# Patient Record
Sex: FEMALE | Race: WHITE | HISPANIC OR LATINO | URBAN - METROPOLITAN AREA
[De-identification: names, ages, dates, MRNs, and addresses within clinical notes are randomized per-mention and may not be internally consistent; named-entity substitution may affect disease eponyms.]

---

## 2023-02-06 DIAGNOSIS — N95.1 MENOPAUSAL AND FEMALE CLIMACTERIC STATES: Primary | ICD-10-CM

## 2024-07-01 ENCOUNTER — OFFICE VISIT (OUTPATIENT)
Dept: GYNECOLOGY | Facility: CLINIC | Age: 54
End: 2024-07-01
Payer: MEDICAID

## 2024-07-01 VITALS
RESPIRATION RATE: 20 BRPM | HEIGHT: 61 IN | TEMPERATURE: 99 F | OXYGEN SATURATION: 100 % | WEIGHT: 168 LBS | DIASTOLIC BLOOD PRESSURE: 85 MMHG | BODY MASS INDEX: 31.72 KG/M2 | SYSTOLIC BLOOD PRESSURE: 121 MMHG | HEART RATE: 94 BPM

## 2024-07-01 DIAGNOSIS — N30.10 INTERSTITIAL CYSTITIS: ICD-10-CM

## 2024-07-01 DIAGNOSIS — Z12.31 BREAST CANCER SCREENING BY MAMMOGRAM: ICD-10-CM

## 2024-07-01 DIAGNOSIS — R35.0 URINARY FREQUENCY: ICD-10-CM

## 2024-07-01 DIAGNOSIS — N95.1 VASOMOTOR SYMPTOMS DUE TO MENOPAUSE: Primary | ICD-10-CM

## 2024-07-01 DIAGNOSIS — N39.46 MIXED STRESS AND URGE URINARY INCONTINENCE: ICD-10-CM

## 2024-07-01 LAB
BACTERIA #/AREA URNS AUTO: ABNORMAL /HPF
BILIRUB UR QL STRIP.AUTO: NEGATIVE
CLARITY UR: CLEAR
COLOR UR AUTO: YELLOW
GLUCOSE UR QL STRIP: NORMAL
HGB UR QL STRIP: NEGATIVE
HYALINE CASTS #/AREA URNS LPF: ABNORMAL /LPF
KETONES UR QL STRIP: NEGATIVE
LEUKOCYTE ESTERASE UR QL STRIP: 250
MUCOUS THREADS URNS QL MICRO: ABNORMAL /LPF
NITRITE UR QL STRIP: NEGATIVE
PH UR STRIP: 5.5 [PH]
PROT UR QL STRIP: ABNORMAL
RBC #/AREA URNS AUTO: ABNORMAL /HPF
SP GR UR STRIP.AUTO: 1.03 (ref 1–1.03)
SQUAMOUS #/AREA URNS LPF: ABNORMAL /HPF
UROBILINOGEN UR STRIP-ACNC: NORMAL
WBC #/AREA URNS AUTO: ABNORMAL /HPF

## 2024-07-01 PROCEDURE — 99215 OFFICE O/P EST HI 40 MIN: CPT | Mod: PBBFAC

## 2024-07-01 PROCEDURE — 81001 URINALYSIS AUTO W/SCOPE: CPT

## 2024-07-01 RX ORDER — CARIPRAZINE 1.5 MG/1
1.5 CAPSULE, GELATIN COATED ORAL
COMMUNITY

## 2024-07-01 RX ORDER — ARMODAFINIL 250 MG/1
TABLET ORAL
COMMUNITY

## 2024-07-01 RX ORDER — HYOSCYAMINE SULFATE 0.125 MG
125 TABLET ORAL
COMMUNITY
Start: 2024-05-30

## 2024-07-01 RX ORDER — CYCLOBENZAPRINE HCL 5 MG
5 TABLET ORAL 2 TIMES DAILY PRN
COMMUNITY
Start: 2024-05-30

## 2024-07-01 RX ORDER — ATENOLOL 50 MG/1
50 TABLET ORAL 2 TIMES DAILY
COMMUNITY

## 2024-07-01 RX ORDER — MIRABEGRON 50 MG/1
1 TABLET, FILM COATED, EXTENDED RELEASE ORAL
COMMUNITY

## 2024-07-01 RX ORDER — VALSARTAN 160 MG/1
160 TABLET ORAL
COMMUNITY

## 2024-07-01 RX ORDER — ROPINIROLE 0.5 MG/1
0.5 TABLET, FILM COATED ORAL
COMMUNITY

## 2024-07-01 RX ORDER — PROGESTERONE 200 MG/1
200 CAPSULE ORAL NIGHTLY
Qty: 30 CAPSULE | Refills: 11 | Status: SHIPPED | OUTPATIENT
Start: 2024-07-01 | End: 2025-07-01

## 2024-07-01 RX ORDER — LEVOTHYROXINE SODIUM 75 UG/1
75 TABLET ORAL
COMMUNITY

## 2024-07-01 RX ORDER — DEXTROAMPHETAMINE SACCHARATE, AMPHETAMINE ASPARTATE, DEXTROAMPHETAMINE SULFATE AND AMPHETAMINE SULFATE 7.5; 7.5; 7.5; 7.5 MG/1; MG/1; MG/1; MG/1
1 TABLET ORAL 2 TIMES DAILY
COMMUNITY

## 2024-07-01 RX ORDER — BUPROPION HYDROCHLORIDE 300 MG/1
TABLET ORAL
COMMUNITY

## 2024-07-01 RX ORDER — ESTRADIOL 0.03 MG/D
1 PATCH TRANSDERMAL
COMMUNITY
End: 2024-07-01 | Stop reason: DRUGHIGH

## 2024-07-01 RX ORDER — ATORVASTATIN CALCIUM 40 MG/1
40 TABLET, FILM COATED ORAL
COMMUNITY

## 2024-07-01 RX ORDER — ESTRADIOL 0.05 MG/D
1 PATCH TRANSDERMAL
Qty: 4 PATCH | Refills: 11 | Status: SHIPPED | OUTPATIENT
Start: 2024-07-01 | End: 2025-07-01

## 2024-07-01 RX ORDER — HYDROXYZINE HYDROCHLORIDE 50 MG/1
TABLET, FILM COATED ORAL
COMMUNITY
Start: 2024-05-30

## 2024-07-01 RX ORDER — ZOLPIDEM TARTRATE 5 MG/1
5 TABLET ORAL NIGHTLY
COMMUNITY
Start: 2024-05-30

## 2024-07-01 NOTE — PROGRESS NOTES
"  [facility] GYNECOLOGY CLINIC NOTE     Richa Blum is a 54 y.o.  presenting to GYN clinic for worsening vasomotor symptoms secondary to menopause, currently on HRT. Her symptoms have been managed with Climara 0.025mg estrogen with the addition of Provera 5mg qD for the past two years, which improved her symptoms until about six months ago. Since then, she has been having night sweats requiring use of a window unit and ceiling fan. Of note, she had one episode of PMB when she went one week without her HRT patch about a year ago; the episode resolved spontaneously with application of the subsequent patch, and she denies any additional episodes.  She also endorses a h/o mixed urinary incontinence, for which she has been taking Myrbetriq for less than a year - she has noted significant improvement in the stress incontinence. In addition, she reports a history of interstitial cystitis (only has occasional, infrequent episodes of "bladder pain" when taking hot showers), for which her urethra was dilated during cystoscopy by her urologist approximately 20y ago - she has not seen a urologist since then. She currently reports increased urinary frequency and urinary hesitancy; denies dysuria, hematuria, incomplete emptying, and urinary retention.     Gynecology  Menopause: age 49  Endorses remote h/o abnormal pap smear at age 24 s/p cryotherapy with subsequently normal pap smears (most recently 1y ago)    OB History    Para Term  AB Living   8 6 6 0 2 6   SAB IAB Ectopic Multiple Live Births   0 0 0 0 0      # Outcome Date GA Lbr Chacorta/2nd Weight Sex Type Anes PTL Lv   8 AB            7 AB            6 Term      Vag-Spont      5 Term      Vag-Spont      4 Term      Vag-Spont      3 Term      Vag-Spont      2 Term      Vag-Spont      1 Term      Vag-Spont        Past Medical History:   Diagnosis Date    Class 1 obesity     Diabetes mellitus     previously on Ozempic, now transitioned to Semaglutide PO    " "History of genital warts     age 19    Hyperlipidemia     Hypertension     managed with Valsartan and Atenolol    Hypothyroidism     managed with Unithroid    Mental disorder       Past Surgical History:   Procedure Laterality Date    CYSTOSCOPY WITH URETHRAL DILATION      interstitial cystitis, last saw Urology since 2004    EXPLORATORY LAPAROTOMY      Endometriosis      Current Outpatient Medications   Medication Instructions    atenoloL (TENORMIN) 50 mg, Oral, 2 times daily    atorvastatin (LIPITOR) 40 mg, Oral    buPROPion (WELLBUTRIN XL) 300 MG 24 hr tablet     cyclobenzaprine (FLEXERIL) 5 mg, Oral, 2 times daily PRN    dextroamphetamine-amphetamine 30 mg Tab 1 tablet, Oral, 2 times daily    estradiol 0.05 mg/24 hr td ptwk (CLIMARA) 0.05 mg/24 hr PTWK 1 patch, Transdermal, Every 7 days    hydrOXYzine (ATARAX) 50 MG tablet Oral    hyoscyamine (ANASPAZ,LEVSIN) 125 mcg, Oral    MYRBETRIQ 50 mg Tb24 1 tablet, Oral    NUVIGIL 250 mg tablet     progesterone (PROMETRIUM) 200 mg, Oral, Nightly    rOPINIRole (REQUIP) 0.5 mg, Oral    semaglutide (RYBELSUS) 3 mg, Oral, Daily    UNITHROID 75 mcg, Oral    valsartan (DIOVAN) 160 mg, Oral    VRAYLAR 1.5 mg, Oral    zolpidem (AMBIEN) 5 mg, Oral, Nightly     Social History     Tobacco Use    Smoking status: Never     Passive exposure: Never    Smokeless tobacco: Never   Substance Use Topics    Alcohol use: Not Currently    Drug use: Never     Review of Systems  Pertinent items are noted in HPI.     Objective:     /85 (BP Location: Left arm, Patient Position: Sitting, BP Method: Large (Automatic))   Pulse 94   Temp 99.1 °F (37.3 °C)   Resp 20   Ht 5' 1" (1.549 m)   Wt 76.2 kg (168 lb)   SpO2 100%   BMI 31.74 kg/m²   Physical Exam:  Gen: Well-nourished, well-developed female appearing stated age. Alert, cooperative, in no acute distress.   CV: Regular rate  Chest: No conversational dyspnea  Abdomen: Soft, non-tender, no masses  Extrem: Extremities normal, atraumatic, " non-tender calves  External genitalia: Normal female genetalia without lesion, discharge or tenderness. Urethra unremarkable and normal in appearance.  Speculum Exam: Vaginal vault mildly atrophic without discharge, non-odorous, no lesions/masses seen. Cervical os visualized as closed, no lesions/masses.   Bimanual Exam: No uterine or cervical motion tenderness. Uterus anteverted, 6cm in size, mobile. No adnexal masses, fullness, or tenderness. No bladder neck or urethral tenderness to palpation.   Note: RN chaperone present for entirety of exam.    Assessment:       54 y.o.  here for evaluation of worsening menopausal vasomotor symptoms, as well as urinary symptoms in the setting of multiple urogynecological conditions.    1. Vasomotor symptoms due to menopause        2. Urinary frequency  Urinalysis, Reflex to Urine Culture      3. Interstitial cystitis  Ambulatory referral/consult to Urology      4. Mixed stress and urge urinary incontinence  Ambulatory referral/consult to Urology      5. Breast cancer screening by mammogram  Mammo Digital Screening Bilat        Plan:     -- menopausal symptoms previously managed on Climara 0.025mg qwk with Provera 5mg qD for endometrial protection; will increase dose of weekly estradiol to 0.05mg and transition Provera to Prometrium 200mg qhs   -- will obtain urinalysis to rule out infectious cause of increasing urinary frequency and urinary hesitancy; will also send Urology referral given lower urinary tract symptoms in the setting of interstitial cystitis, which required urethral dilation in the past, as well as mixed urinary incontinence, currently managed on Myrbetriq  -- MMG ordered for breast cancer screening as most recent was reportedly >4y ago    Problem List Items Addressed This Visit    None  Visit Diagnoses       Vasomotor symptoms due to menopause    -  Primary    Urinary frequency        Relevant Orders    Urinalysis, Reflex to Urine Culture (Completed)     Interstitial cystitis        Relevant Orders    Ambulatory referral/consult to Urology    Mixed stress and urge urinary incontinence        Relevant Orders    Ambulatory referral/consult to Urology    Breast cancer screening by mammogram        Relevant Orders    Mammo Digital Screening Bilat          Return to clinic in 6 months for telemedicine visit    Discussed patient and plan with Dr. Saldana.    Shaina Cuba MD  [employer] [facility], PGY-4

## 2024-08-13 ENCOUNTER — TELEPHONE (OUTPATIENT)
Dept: GYNECOLOGY | Facility: CLINIC | Age: 54
End: 2024-08-13
Payer: MEDICAID

## 2024-08-13 NOTE — TELEPHONE ENCOUNTER
Good morning!     I was following up on referrals and noticed that this patient has not been scheduled to see Urology. Can patient tried to be reached again for scheduling? Or can we get an update on the referral please.     Thank you!

## 2025-03-17 ENCOUNTER — TELEPHONE (OUTPATIENT)
Dept: GYNECOLOGY | Facility: CLINIC | Age: 55
End: 2025-03-17
Payer: MEDICAID

## 2025-03-18 ENCOUNTER — TELEPHONE (OUTPATIENT)
Dept: GYNECOLOGY | Facility: CLINIC | Age: 55
End: 2025-03-18
Payer: MEDICAID

## 2025-03-19 ENCOUNTER — CLINICAL SUPPORT (OUTPATIENT)
Dept: GYNECOLOGY | Facility: CLINIC | Age: 55
End: 2025-03-19
Payer: MEDICAID

## 2025-03-19 DIAGNOSIS — N95.1 VASOMOTOR SYMPTOMS DUE TO MENOPAUSE: Primary | ICD-10-CM

## 2025-03-19 RX ORDER — ZOLPIDEM TARTRATE 10 MG
5 TABLET ORAL NIGHTLY
COMMUNITY
Start: 2025-01-27

## 2025-03-19 RX ORDER — IBUPROFEN 800 MG/1
800 TABLET ORAL EVERY 8 HOURS PRN
COMMUNITY
Start: 2025-01-27

## 2025-03-19 RX ORDER — ORAL SEMAGLUTIDE 14 MG/1
14 TABLET ORAL
COMMUNITY
Start: 2024-12-11

## 2025-03-19 NOTE — ASSESSMENT & PLAN NOTE
- Currently on climara 0.05 mg and prometrium 200 qhs with breakthrough symptoms  - Discussed possible etiology of patch falling off. Encouraged pt to speak to pharmacy to request the brand name, as the generic patch is not sticking on her when she showers. Also discussed covering patch with ziploc or other waterproof material during showers. Will follow up symptoms with these two intervenrtions  - Given breakthrough bleeding, will order pelvic ultrasound   - Can consider uptitrating HRT vs veozah if patient continues to have symptoms at next visit. She has a CMP due to be drawn at the end of this month   - RTC 1 month to follow up

## 2025-03-19 NOTE — PROGRESS NOTES
[facility] Women[facility] Progress Note    Primary Site: [facility]  Patient location: Home  Physician location: In office      Chief Complaint: follow up HRT     HPI  Richa Blum joined me via our telemedicine platform.    Pt on HRT for vasomotor symptoms, was increased to 0.05 mg climara and prometrium 200 qhs on 7/1/2024 for persistent symptoms   Says this worked for a while, but now is having symptoms again. Also forgets to take prometrium and occasionally has breakthrough bleeding  Her pharmacy switched her to generic climara and the patch does not stay on after she showers it falls off. She did not have this problem with the Climara brand name     I have reviewed family history, social history, medications and allergies as documented in the patient's electronic medical record.      Reports, labs, outside records, and images have been reviewed with pertinent interpretations below. See telemedicine notes records for intervening communications.        Assessment/Plan  Problem List Items Addressed This Visit       Vasomotor symptoms due to menopause - Primary    - Currently on climara 0.05 mg and prometrium 200 qhs with breakthrough symptoms  - Discussed possible etiology of patch falling off. Encouraged pt to speak to pharmacy to request the brand name, as the generic patch is not sticking on her when she showers. Also discussed covering patch with ziploc or other waterproof material during showers. Will follow up symptoms with these two intervenrtions  - Given breakthrough bleeding, will order pelvic ultrasound   - Can consider uptitrating HRT vs veozah if patient continues to have symptoms at next visit. She has a CMP due to be drawn at the end of this month   - RTC 1 month to follow up          Relevant Orders    US Pelvis Comp with Transvag NON-OB (xpd         See correspondence above for plan.   Patient's needs assessed and health education provided. Patient understands risks, benefits, and alternatives  of treatment prescribed above. Patient verbalizes understanding and agrees to follow plan.    Patient's identity was confirmed and confidentiality/privacy confirmed prior to visit. I certify that this visit was done via secure two-way transmission with informed consent of the patient and/or guardian.  Each patient to whom I provide medical services by telemedicine is:  (1) informed of the relationship between the physician and patient and the respective role of any other health care provider with respect to management of the patient; and (2) notified that they may decline to receive medical services by telemedicine and may withdraw from such care at any time. Patient verbally consented to receive this service via voice-only telephone call.    .soco

## 2025-03-25 ENCOUNTER — HOSPITAL ENCOUNTER (OUTPATIENT)
Dept: RADIOLOGY | Facility: HOSPITAL | Age: 55
Discharge: HOME | End: 2025-03-25
Attending: STUDENT IN AN ORGANIZED HEALTH CARE EDUCATION/TRAINING PROGRAM
Payer: MEDICAID

## 2025-03-25 DIAGNOSIS — N95.1 VASOMOTOR SYMPTOMS DUE TO MENOPAUSE: ICD-10-CM

## 2025-03-25 PROCEDURE — 76830 TRANSVAGINAL US NON-OB: CPT | Mod: TC

## 2025-05-07 ENCOUNTER — CLINICAL SUPPORT (OUTPATIENT)
Dept: GYNECOLOGY | Facility: CLINIC | Age: 55
End: 2025-05-07
Payer: MEDICAID

## 2025-05-07 DIAGNOSIS — R93.89 INCREASED ENDOMETRIAL STRIPE THICKNESS: ICD-10-CM

## 2025-05-07 DIAGNOSIS — N95.1 VASOMOTOR SYMPTOMS DUE TO MENOPAUSE: Primary | ICD-10-CM

## 2025-05-07 NOTE — PROGRESS NOTES
[facility] Women[facility] Clinic Progress Note    Primary Site: [facility]  Patient location: Home  Physician location: In office      Chief Complaint: f/up HRT and  f/up pelvic US    HPI  Richa Blum joined me via our telemedicine platform.    55yo  was seen 2024 for worseing vasomotor symptoms of menopause. She had already been on climara 0.025/PO provera 5mg for 2 years, but 2024 her symptoms started to worsen. At the visit she reported a single episode of PMB when she missed her HRT for a week. 2024 her climara dose was increased to 0.05mg and provera was switched to prometrium .    At her f/up 3/2025 she reported the increased dose worked for a while but then symptoms returned. She reported having forgotten to take the prometrium a few times and having vaginal bleeding. Pelvic US was ordered.    Pelvic US shows thickened endometrial stripe at 5.4mm. she reports no further bleeding after 3.2025 bc she set an alarm for the prometrium.      I have reviewed family history, social history, medications and allergies as documented in the patient's electronic medical record.    Reports, labs, outside records, and images have been reviewed with pertinent interpretations below. See telemedicine notes records for intervening communications.    Assessment/Plan  Problem List Items Addressed This Visit          Renal/    Vasomotor symptoms due to menopause - Primary     Other Visit Diagnoses         Increased endometrial stripe thickness                    Due to thickened EMS noted on US, will plan to do endometrial sampling- appt in 1-2 wk  Recommend patient stop using the climara and prometrium now  Discussed veozah as a potential option for vasomotor sx of menopause  Patient reports medical problems of- DM (A1c <6 per patient) ,hypothyroid, HTN. Rarely drinks alcohol. No hep B or C.     Had labs at [facility] 2025 - cannot see in care everywhere. Patient reports them as:  AST/ALT   Cr  0.92    Sent message to clinic staff to request lab records. Reviewed need for CMP monthly followed by q3 month to monitor for transaminitis. Will send rx for 1 mo, schedule for telephone call in 1mo to review sx and order next CMP    Appts for EMB and veozah f/up requested    See correspondence above for plan.   Patient's needs assessed and health education provided. Patient understands risks, benefits, and alternatives of treatment prescribed above. Patient verbalizes understanding and agrees to follow plan.    Patient's identity was confirmed and confidentiality/privacy confirmed prior to visit. I certify that this visit was done via secure two-way transmission with informed consent of the patient and/or guardian.  Each patient to whom I provide medical services by telemedicine is:  (1) informed of the relationship between the physician and patient and the respective role of any other health care provider with respect to management of the patient; and (2) notified that they may decline to receive medical services by telemedicine and may withdraw from such care at any time. Patient verbally consented to receive this service via voice-only telephone call.    Danae Phillips MD  [facility] PGY4

## 2025-05-08 ENCOUNTER — TELEPHONE (OUTPATIENT)
Dept: GYNECOLOGY | Facility: CLINIC | Age: 55
End: 2025-05-08
Payer: MEDICAID

## 2025-05-08 NOTE — TELEPHONE ENCOUNTER
----- Message from Danae Phillips sent at 5/7/2025  6:41 PM CDT -----  Regarding: records  Hello, she recently got a CMP at [facility]. I can't see it in care everywhere, can you please request a copy of this? Thanks!

## 2025-05-08 NOTE — TELEPHONE ENCOUNTER
"----- Message from Danae Phillips sent at 5/7/2025  6:52 PM CDT -----  Regarding: schedule 2 appts  Please schedule for emb in 1-2 weeksAnd schedule for "veozah f/up" in 1mo telemed  "

## 2025-06-11 ENCOUNTER — LAB VISIT (OUTPATIENT)
Dept: LAB | Facility: HOSPITAL | Age: 55
End: 2025-06-11
Attending: STUDENT IN AN ORGANIZED HEALTH CARE EDUCATION/TRAINING PROGRAM
Payer: MEDICAID

## 2025-06-11 ENCOUNTER — PROCEDURE VISIT (OUTPATIENT)
Dept: GYNECOLOGY | Facility: CLINIC | Age: 55
End: 2025-06-11
Payer: MEDICAID

## 2025-06-11 VITALS
RESPIRATION RATE: 18 BRPM | OXYGEN SATURATION: 100 % | HEIGHT: 61 IN | BODY MASS INDEX: 39.46 KG/M2 | WEIGHT: 209 LBS | SYSTOLIC BLOOD PRESSURE: 135 MMHG | TEMPERATURE: 98 F | DIASTOLIC BLOOD PRESSURE: 84 MMHG | HEART RATE: 100 BPM

## 2025-06-11 DIAGNOSIS — N95.1 VASOMOTOR SYMPTOMS DUE TO MENOPAUSE: ICD-10-CM

## 2025-06-11 DIAGNOSIS — N95.0 POSTMENOPAUSAL BLEEDING: Primary | ICD-10-CM

## 2025-06-11 LAB
ALBUMIN SERPL-MCNC: 3.8 G/DL (ref 3.5–5)
ALBUMIN/GLOB SERPL: 1 RATIO (ref 1.1–2)
ALP SERPL-CCNC: 130 UNIT/L (ref 40–150)
ALT SERPL-CCNC: 38 UNIT/L (ref 0–55)
ANION GAP SERPL CALC-SCNC: 8 MEQ/L
AST SERPL-CCNC: 28 UNIT/L (ref 11–45)
BILIRUB SERPL-MCNC: 0.5 MG/DL
BUN SERPL-MCNC: 15.2 MG/DL (ref 9.8–20.1)
CALCIUM SERPL-MCNC: 9.3 MG/DL (ref 8.4–10.2)
CHLORIDE SERPL-SCNC: 105 MMOL/L (ref 98–107)
CO2 SERPL-SCNC: 26 MMOL/L (ref 22–29)
CREAT SERPL-MCNC: 1 MG/DL (ref 0.55–1.02)
CREAT/UREA NIT SERPL: 15
GFR SERPLBLD CREATININE-BSD FMLA CKD-EPI: >60 ML/MIN/1.73/M2
GLOBULIN SER-MCNC: 3.8 GM/DL (ref 2.4–3.5)
GLUCOSE SERPL-MCNC: 162 MG/DL (ref 74–100)
POTASSIUM SERPL-SCNC: 4.9 MMOL/L (ref 3.5–5.1)
PROT SERPL-MCNC: 7.6 GM/DL (ref 6.4–8.3)
SODIUM SERPL-SCNC: 139 MMOL/L (ref 136–145)

## 2025-06-11 PROCEDURE — 88305 TISSUE EXAM BY PATHOLOGIST: CPT | Mod: TC

## 2025-06-11 PROCEDURE — 36415 COLL VENOUS BLD VENIPUNCTURE: CPT

## 2025-06-11 PROCEDURE — 80053 COMPREHEN METABOLIC PANEL: CPT

## 2025-06-11 RX ORDER — CARIPRAZINE 1.5 MG/1
1.5 CAPSULE, GELATIN COATED ORAL
COMMUNITY

## 2025-06-12 NOTE — PROCEDURES
Richa Blum presented to clinic for EMB in setting of thickened EMS and few episodes of PMB when patient was on HRT and forgot to take prometrium sometimes.    Per prior documentation:  She had already been on climara 0.025/PO provera 5mg for 2 years, but 1/2024 her symptoms started to worsen. At the visit she reported a single episode of PMB when she missed her HRT for a week. 7/2024 her climara dose was increased to 0.05mg and provera was switched to prometrium .  At her f/up 3/2025 she reported the increased dose worked for a while but then symptoms returned. She reported having forgotten to take the prometrium a few times and having vaginal bleeding. Pelvic US was ordered.  Pelvic US shows thickened endometrial stripe at 5.4mm. she reports no further bleeding after 3.2025 bc she set an alarm for the prometrium.    HRT was discontinued and she was switched to Veozah for vasomotor symptoms. Reports it is okay but not as good as the HRT.    Vitals:    06/11/25 1305   BP: 135/84   Pulse: 100   Resp: 18   Temp: 98.3 °F (36.8 °C)     Endometrial biopsy    Date/Time: 6/11/2025 12:15 PM    Performed by: Danae Phillips MD  Authorized by: Danae Phillips MD    Consent:     Prior to procedure the appropriate consent was completed and verified      Consent given by:  Patient    Patient questions answered: yes      Patient agrees, verbalizes understanding, and wants to proceed: yes      Instructions and paperwork completed: yes    Pre-procedure:     Pre-procedure timeout performed: yes    Procedure:     Procedure: endometrial biopsy with Pipelle      Cervix cleaned and prepped: yes      Uterus sound depth (cm):  9    Curettes used: 3 passes.    Specimen collected: specimen collected and sent to pathology      Patient tolerated procedure well with no complications: yes      PMB- EMB completed today  Vasomotor symptoms- continue veozah. CMP today to trend LFTs.    Danae Phillips MD  [facility] PGY4

## 2025-06-13 LAB
ESTROGEN SERPL-MCNC: NORMAL PG/ML
INSULIN SERPL-ACNC: NORMAL U[IU]/ML
LAB AP GROSS DESCRIPTION: NORMAL
LAB AP REPORT FOOTNOTES: NORMAL

## 2025-06-14 ENCOUNTER — RESULTS FOLLOW-UP (OUTPATIENT)
Dept: GYNECOLOGY | Facility: CLINIC | Age: 55
End: 2025-06-14

## 2025-06-14 DIAGNOSIS — N95.0 POSTMENOPAUSAL BLEEDING: Primary | ICD-10-CM

## 2025-06-16 ENCOUNTER — TELEPHONE (OUTPATIENT)
Dept: GYNECOLOGY | Facility: CLINIC | Age: 55
End: 2025-06-16
Payer: MEDICAID

## 2025-06-16 NOTE — TELEPHONE ENCOUNTER
Called and inform patient about her US appointment on 06/202/2025 at 1 pm. Patient verbalized understanding.     ----- Message from Danae Phillips sent at 6/16/2025  2:49 PM CDT -----  Regarding: pelvic ultrasound  Patient has a virtual follow-up appointment scheduled 7/2, I just ordered an ultrasound, is there any way we can get the ultrasound scheduled before her follow-up appointment?    If not, then can we get the ultrasound scheduled and move her appointment after the ultrasound is done, thanks

## 2025-06-20 ENCOUNTER — HOSPITAL ENCOUNTER (OUTPATIENT)
Dept: RADIOLOGY | Facility: HOSPITAL | Age: 55
Discharge: HOME | End: 2025-06-20
Attending: STUDENT IN AN ORGANIZED HEALTH CARE EDUCATION/TRAINING PROGRAM
Payer: MEDICAID

## 2025-06-20 DIAGNOSIS — N95.0 POSTMENOPAUSAL BLEEDING: ICD-10-CM

## 2025-06-20 PROCEDURE — 76830 TRANSVAGINAL US NON-OB: CPT | Mod: TC

## 2025-07-02 ENCOUNTER — CLINICAL SUPPORT (OUTPATIENT)
Dept: GYNECOLOGY | Facility: CLINIC | Age: 55
End: 2025-07-02
Payer: MEDICAID

## 2025-07-02 DIAGNOSIS — N95.1 VASOMOTOR SYMPTOMS DUE TO MENOPAUSE: ICD-10-CM

## 2025-07-02 DIAGNOSIS — N95.0 POSTMENOPAUSAL BLEEDING: ICD-10-CM

## 2025-07-02 DIAGNOSIS — R93.89 THICKENED ENDOMETRIUM: Primary | ICD-10-CM

## 2025-07-02 RX ORDER — PAROXETINE 7.5 MG/1
7.5 CAPSULE ORAL NIGHTLY
Qty: 30 CAPSULE | Refills: 5 | Status: SHIPPED | OUTPATIENT
Start: 2025-07-02 | End: 2025-07-02

## 2025-07-02 RX ORDER — EZETIMIBE 10 MG
TABLET ORAL
COMMUNITY
Start: 2025-06-26

## 2025-07-02 NOTE — H&P (VIEW-ONLY)
[facility] Preoperative Visit History and Physical    Primary Site: [facility]  Patient location: Home  Physician location: In office    HPI:   55 y.o.  with thickened EMS and PMB while on HRT. She was initially on climara 0.025/provera 5mg daily but felt that the medication was not providing symptomatic relief after 2 years. Around that time, she had an episode of PMB after missing her HRT. In 2024, her dose of climara was increased to 0.05 and she was also transitioned to Prometrium. However, she was seen 3/2025 and reported breakthrough bleeding after missing a few doses of her Prometrium. TVUS obtained at that time and was 5.4mm. With plan for EMB and repeat TVUS a few months later. EMB was benign, but repeat TVUS showed an EMS of 12mm.     GynHx:  LMP:   Contraception: None  Pap Hx: remote history of abnormal pap at 23y/o; last pap  normal per patient  STI Hx: Denies    OB History    Para Term  AB Living   8 6 6 0 2 6   SAB IAB Ectopic Multiple Live Births             # Outcome Date GA Lbr Chacorta/2nd Weight Sex Type Anes PTL Lv   8 AB            7 AB            6 Term      Vag-Spont      5 Term      Vag-Spont      4 Term      Vag-Spont      3 Term      Vag-Spont      2 Term      Vag-Spont      1 Term      Vag-Spont          Past Medical History:   Diagnosis Date    Anxiety     Class 1 obesity     Depression     Diabetes mellitus     previously on Ozempic, now transitioned to Semaglutide PO    Dyspareunia     Endometriosis of uterus     History of genital warts     age 19    Hyperlipidemia     Hypertension     managed with Valsartan and Atenolol    Hypothyroidism     managed with Unithroid    Mental disorder     Urinary incontinence        Current Outpatient Medications   Medication Instructions    AMBIEN 5 mg, Nightly    atenoloL (TENORMIN) 50 mg, 2 times daily    buPROPion (WELLBUTRIN XL) 300 MG 24 hr tablet     cyclobenzaprine (FLEXERIL) 5 mg, 2 times daily PRN     dextroamphetamine-amphetamine 30 mg Tab 1 tablet, 2 times daily    hydrOXYzine (ATARAX) 50 MG tablet Take by mouth.    hyoscyamine (ANASPAZ,LEVSIN) 125 mcg    MYRBETRIQ 50 mg Tb24 1 tablet    NUVIGIL 250 mg tablet     rOPINIRole (REQUIP) 0.5 mg    SEMAGLUTIDE ORAL Take by mouth.    UNABLE TO FIND Magic mouthwash    UNITHROID 75 mcg    valsartan (DIOVAN) 160 mg    VRAYLAR 1.5 mg    ZETIA 10 mg tablet 1 tablet Orally Once a day for 30 days       Past Surgical History:   Procedure Laterality Date    CERVIX LESION DESTRUCTION      CYSTOSCOPY WITH URETHRAL DILATION      interstitial cystitis, last saw Urology since 2004    EXPLORATORY LAPAROTOMY      Endometriosis    GYNECOLOGIC CRYOSURGERY      HYSTEROSCOPY      PELVIC LAPAROSCOPY           Family History   Problem Relation Name Age of Onset    Hypertension Mother Suki     Hyperlipidemia Mother Suki     Bipolar disorder Mother Suki     Stroke Mother Suki     Thyroid cancer Brother      Endometrial cancer Maternal Grandmother Granny     Cancer Brother Jai     Thyroid disease Brother Jai     Diabetes Maternal Uncle Maxwell     Breast cancer Neg Hx      Ovarian cancer Neg Hx      Colon cancer Neg Hx      Uterine cancer Neg Hx         Social History     Socioeconomic History    Marital status:    Tobacco Use    Smoking status: Never     Passive exposure: Never    Smokeless tobacco: Never   Substance and Sexual Activity    Alcohol use: Not Currently    Drug use: Never    Sexual activity: Yes     Partners: Male     Birth control/protection: Post-menopausal     Comment: One male partner within the last year     Lives with:  and children;  will be able to provide transportation to surgery   Work: no job     Review of patient's allergies indicates:   Allergen Reactions    Azithromycin Other (See Comments)    Cephalexin Other (See Comments)    Ciprofloxacin hcl     Hydroxyzine hcl Other (See Comments)    Trazodone hcl Other (See Comments)    Cefprozil  Rash    Erythromycin Rash    Penicillins Rash       Review of Systems: As stated in HPI.      Objective:     There were no vitals filed for this visit.  There is no height or weight on file to calculate BMI.    PHYSICAL EXAM (Examination unable to be completed due to telemedicine visit)     LABS:  EMB (2025) benign surface endometrial tissue  Glucose (2025, 2025) 118, 162    IMAGING:  US Pelvis Comp with Transvag NON-OB (xpd  Narrative: EXAMINATION:  US PELVIS COMP WITH TRANSVAG NON-OB (XPD)    CLINICAL HISTORY:  Postmenopausal bleeding    TECHNIQUE:  Exam is performed both transabdominally and endovaginally    COMPARISON:  2025    FINDINGS:  Uterus measures 7.9 by 3.4 x 4.7 cm.  The endometrial stripe is 12 mm which is abnormally thickened for postmenopausal patient.  There are nabothian cyst present.    The right adnexa was evaluated the ovary is not identified due to overlying bowel gas.    The left adnexa was evaluated the ovary is not identified due to overlying bowel gas  Impression: Abnormally thickened endometrial stripe for postmenopausal patient would consider an endometrial biopsy.    Electronically signed by: Pablito Vang MD  Date:    2025  Time:    08:13       Assessment:      55 y.o.  with thickened EMS and PMB, going for hysteroscopy D&C.     Problem List Items Addressed This Visit          Renal/    Vasomotor symptoms due to menopause     Other Visit Diagnoses         Thickened endometrium    -  Primary    Relevant Orders    Case Request Operating Room: HYSTEROSCOPY, WITH DILATION AND CURETTAGE OF UTERUS (Completed)      Postmenopausal bleeding        Relevant Orders    Case Request Operating Room: HYSTEROSCOPY, WITH DILATION AND CURETTAGE OF UTERUS (Completed)            Plan:     Counseling: Alternatives to this planned procedure were explained to the patient including expectant management, medical management, or serial ultrasounds with risk of disease progression to  malignancy. This procedure and its risks, reasons, benefits and complications (including perforation, subsequent injury to bowel, air/gas embolism, fluid overload, major blood vessel,hemorrhage, possibility of transfusion, infection, scarring, dyspareunia, further surgery, failure of the procedure) were reviewed in detail. Complication rate less than 1% overall.   We have reviewed the typical perioperative course and post-operative precautions and restrictions have been reviewed.  Discussed that if pathology results benign, then will be able to resume HRT post-operatively; recommend combipatch     Transfusion of blood and blood products discussed. Patient was consented for blood transfusion in the case of an emergency.  She understands the possibility of blood transfusion reaction and the attendant risk of transmission of HIV & Hepatitis C to be 1 in 2 million and the risk of Hepatitis B to be 1 in 200,000.  She is aware of the possibility of transfusion reaction. All questions were answered.   Patient understands we are affiliated with a teaching institution and residents and medical students will be involved in her care.  She has consented to an exam under anesthesia and understands that medical students participate in this portion of the procedure.  All questions were answered.    Preop testing ordered. Surgical consents signed.  Instructions reviewed, including NPO after midnight.   Counseled to hold the following medications: Valsartan DOS, Semaglutide DOS, Zetia 24hr prior to surgery; rest per anesthesia  Current contraception: None    To OR for HSC D&C with Dr. Morales    Scheduled on 07/10/25    Discussed with Dr. Carmen Rodriguez MD   PGY3, Obstetrics & Gynecology   [facility]       Patient's identity was confirmed and confidentiality/privacy confirmed prior to visit. I certify that this visit was done via secure two-way transmission with informed consent of the patient and/or  guardian.  Each patient to whom I provide medical services by telemedicine is:  (1) informed of the relationship between the physician and patient and the respective role of any other health care provider with respect to management of the patient; and (2) notified that they may decline to receive medical services by telemedicine and may withdraw from such care at any time. Patient verbally consented to receive this service via voice-only telephone call.    Audio and video were selected for conferencing with patient.

## 2025-07-02 NOTE — PROGRESS NOTES
[facility] Preoperative Visit History and Physical    Primary Site: [facility]  Patient location: Home  Physician location: In office    HPI:   55 y.o.  with thickened EMS and PMB while on HRT. She was initially on climara 0.025/provera 5mg daily but felt that the medication was not providing symptomatic relief after 2 years. Around that time, she had an episode of PMB after missing her HRT. In 2024, her dose of climara was increased to 0.05 and she was also transitioned to Prometrium. However, she was seen 3/2025 and reported breakthrough bleeding after missing a few doses of her Prometrium. TVUS obtained at that time and was 5.4mm. With plan for EMB and repeat TVUS a few months later. EMB was benign, but repeat TVUS showed an EMS of 12mm.     GynHx:  LMP:   Contraception: None  Pap Hx: remote history of abnormal pap at 23y/o; last pap  normal per patient  STI Hx: Denies    OB History    Para Term  AB Living   8 6 6 0 2 6   SAB IAB Ectopic Multiple Live Births             # Outcome Date GA Lbr Chacorta/2nd Weight Sex Type Anes PTL Lv   8 AB            7 AB            6 Term      Vag-Spont      5 Term      Vag-Spont      4 Term      Vag-Spont      3 Term      Vag-Spont      2 Term      Vag-Spont      1 Term      Vag-Spont          Past Medical History:   Diagnosis Date    Anxiety     Class 1 obesity     Depression     Diabetes mellitus     previously on Ozempic, now transitioned to Semaglutide PO    Dyspareunia     Endometriosis of uterus     History of genital warts     age 19    Hyperlipidemia     Hypertension     managed with Valsartan and Atenolol    Hypothyroidism     managed with Unithroid    Mental disorder     Urinary incontinence        Current Outpatient Medications   Medication Instructions    AMBIEN 5 mg, Nightly    atenoloL (TENORMIN) 50 mg, 2 times daily    buPROPion (WELLBUTRIN XL) 300 MG 24 hr tablet     cyclobenzaprine (FLEXERIL) 5 mg, 2 times daily PRN     dextroamphetamine-amphetamine 30 mg Tab 1 tablet, 2 times daily    hydrOXYzine (ATARAX) 50 MG tablet Take by mouth.    hyoscyamine (ANASPAZ,LEVSIN) 125 mcg    MYRBETRIQ 50 mg Tb24 1 tablet    NUVIGIL 250 mg tablet     rOPINIRole (REQUIP) 0.5 mg    SEMAGLUTIDE ORAL Take by mouth.    UNABLE TO FIND Magic mouthwash    UNITHROID 75 mcg    valsartan (DIOVAN) 160 mg    VRAYLAR 1.5 mg    ZETIA 10 mg tablet 1 tablet Orally Once a day for 30 days       Past Surgical History:   Procedure Laterality Date    CERVIX LESION DESTRUCTION      CYSTOSCOPY WITH URETHRAL DILATION      interstitial cystitis, last saw Urology since 2004    EXPLORATORY LAPAROTOMY      Endometriosis    GYNECOLOGIC CRYOSURGERY      HYSTEROSCOPY      PELVIC LAPAROSCOPY           Family History   Problem Relation Name Age of Onset    Hypertension Mother Suki     Hyperlipidemia Mother Suki     Bipolar disorder Mother Suki     Stroke Mother Suki     Thyroid cancer Brother      Endometrial cancer Maternal Grandmother Granny     Cancer Brother Jai     Thyroid disease Brother Jai     Diabetes Maternal Uncle Maxwell     Breast cancer Neg Hx      Ovarian cancer Neg Hx      Colon cancer Neg Hx      Uterine cancer Neg Hx         Social History     Socioeconomic History    Marital status:    Tobacco Use    Smoking status: Never     Passive exposure: Never    Smokeless tobacco: Never   Substance and Sexual Activity    Alcohol use: Not Currently    Drug use: Never    Sexual activity: Yes     Partners: Male     Birth control/protection: Post-menopausal     Comment: One male partner within the last year     Lives with:  and children;  will be able to provide transportation to surgery   Work: no job     Review of patient's allergies indicates:   Allergen Reactions    Azithromycin Other (See Comments)    Cephalexin Other (See Comments)    Ciprofloxacin hcl     Hydroxyzine hcl Other (See Comments)    Trazodone hcl Other (See Comments)    Cefprozil  Rash    Erythromycin Rash    Penicillins Rash       Review of Systems: As stated in HPI.      Objective:     There were no vitals filed for this visit.  There is no height or weight on file to calculate BMI.    PHYSICAL EXAM (Examination unable to be completed due to telemedicine visit)     LABS:  EMB (2025) benign surface endometrial tissue  Glucose (2025, 2025) 118, 162    IMAGING:  US Pelvis Comp with Transvag NON-OB (xpd  Narrative: EXAMINATION:  US PELVIS COMP WITH TRANSVAG NON-OB (XPD)    CLINICAL HISTORY:  Postmenopausal bleeding    TECHNIQUE:  Exam is performed both transabdominally and endovaginally    COMPARISON:  2025    FINDINGS:  Uterus measures 7.9 by 3.4 x 4.7 cm.  The endometrial stripe is 12 mm which is abnormally thickened for postmenopausal patient.  There are nabothian cyst present.    The right adnexa was evaluated the ovary is not identified due to overlying bowel gas.    The left adnexa was evaluated the ovary is not identified due to overlying bowel gas  Impression: Abnormally thickened endometrial stripe for postmenopausal patient would consider an endometrial biopsy.    Electronically signed by: Pablito Vang MD  Date:    2025  Time:    08:13       Assessment:      55 y.o.  with thickened EMS and PMB, going for hysteroscopy D&C.     Problem List Items Addressed This Visit          Renal/    Vasomotor symptoms due to menopause     Other Visit Diagnoses         Thickened endometrium    -  Primary    Relevant Orders    Case Request Operating Room: HYSTEROSCOPY, WITH DILATION AND CURETTAGE OF UTERUS (Completed)      Postmenopausal bleeding        Relevant Orders    Case Request Operating Room: HYSTEROSCOPY, WITH DILATION AND CURETTAGE OF UTERUS (Completed)            Plan:     Counseling: Alternatives to this planned procedure were explained to the patient including expectant management, medical management, or serial ultrasounds with risk of disease progression to  malignancy. This procedure and its risks, reasons, benefits and complications (including perforation, subsequent injury to bowel, air/gas embolism, fluid overload, major blood vessel,hemorrhage, possibility of transfusion, infection, scarring, dyspareunia, further surgery, failure of the procedure) were reviewed in detail. Complication rate less than 1% overall.   We have reviewed the typical perioperative course and post-operative precautions and restrictions have been reviewed.  Discussed that if pathology results benign, then will be able to resume HRT post-operatively; recommend combipatch     Transfusion of blood and blood products discussed. Patient was consented for blood transfusion in the case of an emergency.  She understands the possibility of blood transfusion reaction and the attendant risk of transmission of HIV & Hepatitis C to be 1 in 2 million and the risk of Hepatitis B to be 1 in 200,000.  She is aware of the possibility of transfusion reaction. All questions were answered.   Patient understands we are affiliated with a teaching institution and residents and medical students will be involved in her care.  She has consented to an exam under anesthesia and understands that medical students participate in this portion of the procedure.  All questions were answered.    Preop testing ordered. Surgical consents signed.  Instructions reviewed, including NPO after midnight.   Counseled to hold the following medications: Valsartan DOS, Semaglutide DOS, Zetia 24hr prior to surgery; rest per anesthesia  Current contraception: None    To OR for HSC D&C with Dr. Morales    Scheduled on 07/10/25    Discussed with Dr. Carmen Rodriguez MD   PGY3, Obstetrics & Gynecology   [facility]       Patient's identity was confirmed and confidentiality/privacy confirmed prior to visit. I certify that this visit was done via secure two-way transmission with informed consent of the patient and/or  guardian.  Each patient to whom I provide medical services by telemedicine is:  (1) informed of the relationship between the physician and patient and the respective role of any other health care provider with respect to management of the patient; and (2) notified that they may decline to receive medical services by telemedicine and may withdraw from such care at any time. Patient verbally consented to receive this service via voice-only telephone call.    Audio and video were selected for conferencing with patient.

## 2025-07-03 ENCOUNTER — ANESTHESIA EVENT (OUTPATIENT)
Dept: SURGERY | Facility: HOSPITAL | Age: 55
End: 2025-07-03
Payer: MEDICAID

## 2025-07-03 RX ORDER — GLUCAGON 1 MG
1 KIT INJECTION
Status: CANCELLED | OUTPATIENT
Start: 2025-07-03

## 2025-07-03 RX ORDER — INSULIN ASPART 100 [IU]/ML
0-5 INJECTION, SOLUTION INTRAVENOUS; SUBCUTANEOUS ONCE AS NEEDED
Status: CANCELLED | OUTPATIENT
Start: 2025-07-03 | End: 2036-11-29

## 2025-07-03 NOTE — ANESTHESIA PREPROCEDURE EVALUATION
"Richa Blum is a 55 y.o. female presenting for HYSTEROSCOPY, WITH DILATION AND CURETTAGE OF UTERUS (Abdomen) with a history of   -thickened endometrium      Vitals:    07/10/25 0907 07/10/25 1050   BP: (!) 143/94 (!) 143/102   BP Location: Right arm    Patient Position: Lying    Pulse: 85 69   Resp: 18 16   Temp: 36.9 °C (98.5 °F) 36.7 °C (98 °F)   TempSrc: Oral Oral   SpO2: 96% 100%       -PMB  -HTN  -HLD  -HYPOTHYROIDISM  -DM; AM  @ 0910  -ANXIETY/DEPRESSION  -OBESITY, BMI      BETA-BLOCKER: ATENOLOL   Last dose:  SEMAGLUTIDE LD (HOLD X 1 WK PRIOR):      New Orders for Anesthesia: DM PROTOCOL, UPT NEG DOS     Problem List[1]    Past Surgical History:   Procedure Laterality Date    CERVIX LESION DESTRUCTION      CYSTOSCOPY WITH URETHRAL DILATION      interstitial cystitis, last saw Urology since 2004    EXPLORATORY LAPAROTOMY      Endometriosis    GYNECOLOGIC CRYOSURGERY      HYSTEROSCOPY      PELVIC LAPAROSCOPY         No results found for: "WBC", "HGB", "HCT", "PLT"    CMP  Sodium   Date Value Ref Range Status   06/11/2025 139 136 - 145 mmol/L Final     Potassium   Date Value Ref Range Status   06/11/2025 4.9 3.5 - 5.1 mmol/L Final     Chloride   Date Value Ref Range Status   06/11/2025 105 98 - 107 mmol/L Final     CO2   Date Value Ref Range Status   06/11/2025 26 22 - 29 mmol/L Final     Glucose   Date Value Ref Range Status   06/11/2025 162 (H) 74 - 100 mg/dL Final     Blood Urea Nitrogen   Date Value Ref Range Status   06/11/2025 15.2 9.8 - 20.1 mg/dL Final     Creatinine   Date Value Ref Range Status   06/11/2025 1.00 0.55 - 1.02 mg/dL Final     Calcium   Date Value Ref Range Status   06/11/2025 9.3 8.4 - 10.2 mg/dL Final     Protein Total   Date Value Ref Range Status   06/11/2025 7.6 6.4 - 8.3 gm/dL Final     Albumin   Date Value Ref Range Status   06/11/2025 3.8 3.5 - 5.0 g/dL Final     Bilirubin Total   Date Value Ref Range Status   06/11/2025 0.5 <=1.5 mg/dL Final     ALP   Date Value " Ref Range Status   06/11/2025 130 40 - 150 unit/L Final     AST   Date Value Ref Range Status   06/11/2025 28 11 - 45 unit/L Final     ALT   Date Value Ref Range Status   06/11/2025 38 0 - 55 unit/L Final     eGFR   Date Value Ref Range Status   06/11/2025 >60 mL/min/1.73/m2 Final     Comment:     Estimated GFR calculated using the CKD-EPI creatinine (2021) equation.      Latest Reference Range & Units Most Recent   TSH 0.450 - 4.500 uIU/mL 2.990 (E)  2/12/24 13:49   T4, Free 0.82 - 1.77 ng/dL 1.34 (E)  2/12/24 13:49   (E): External lab result      Current Outpatient Medications   Medication Instructions    AMBIEN 5 mg, Nightly    atenoloL (TENORMIN) 50 mg, 2 times daily    buPROPion (WELLBUTRIN XL) 300 MG 24 hr tablet     cyclobenzaprine (FLEXERIL) 5 mg, 2 times daily PRN    dextroamphetamine-amphetamine 30 mg Tab 1 tablet, 2 times daily    hydrOXYzine (ATARAX) 50 MG tablet Take by mouth.    hyoscyamine (ANASPAZ,LEVSIN) 125 mcg    MYRBETRIQ 50 mg Tb24 1 tablet    NUVIGIL 250 mg tablet     rOPINIRole (REQUIP) 0.5 mg    SEMAGLUTIDE ORAL Take by mouth.    UNABLE TO FIND Magic mouthwash    UNITHROID 75 mcg    valsartan (DIOVAN) 160 mg    VRAYLAR 1.5 mg    ZETIA 10 mg tablet 1 tablet Orally Once a day for 30 days         Pre-op Assessment    I have reviewed the Patient Summary Reports.     I have reviewed the Nursing Notes. I have reviewed the NPO Status.   I have reviewed the Medications.     Review of Systems  Anesthesia Hx:  No problems with previous Anesthesia   History of prior surgery of interest to airway management or planning:          Denies Family Hx of Anesthesia complications.    Denies Personal Hx of Anesthesia complications.                    Hematology/Oncology:  Hematology Normal   Oncology Normal                                   EENT/Dental:  EENT/Dental Normal           Cardiovascular:  Cardiovascular Normal                                              Pulmonary:  Pulmonary Normal                        Renal/:  Renal/ Normal                 Hepatic/GI:  Hepatic/GI Normal                    Musculoskeletal:  Musculoskeletal Normal                Neurological:  Neurology Normal                                      Endocrine:  Endocrine Normal            Dermatological:  Skin Normal    Psych:  Psychiatric Normal                  Physical Exam  General: Cooperative, Well nourished, Alert and Oriented    Airway:  Mallampati: II / III/ II  Mouth Opening: Normal  TM Distance: Normal  Tongue: Normal  Neck ROM: Normal ROM    Anesthesia Plan  Type of Anesthesia, risks & benefits discussed:    Anesthesia Type: Gen Supraglottic Airway  Intra-op Monitoring Plan: Standard ASA Monitors  Post Op Pain Control Plan: IV/PO Opioids PRN  Induction:  IV  Airway Plan: Direct  Informed Consent: Informed consent signed with the Patient representative and all parties understand the risks and agree with anesthesia plan.  All questions answered. Patient consented to blood products? No  ASA Score: 3  Day of Surgery Review of History & Physical: H&P Update referred to the surgeon/provider.    Ready For Surgery From Anesthesia Perspective.     .               [1]  Patient Active Problem List  Diagnosis    Vasomotor symptoms due to menopause

## 2025-07-03 NOTE — OR NURSING
"[facility] NOTE    During Pre-op call pt reported going to [facility] "a while ago." Call made to [facility] to confirm her last visit of "May 2023." Request for progress notes and diagnostics faxed to [facility] [phone].    During the call patient also reported having an elevated A1C. She stated "It was 8.1 last time I was checked." Request for progress notes and diagnostics sent to [facility]. [phone] () [phone] (fax)    "

## 2025-07-06 RX ORDER — MISOPROSTOL 200 UG/1
200 TABLET ORAL
Status: CANCELLED | OUTPATIENT
Start: 2025-07-07 | End: 2025-07-07

## 2025-07-06 RX ORDER — FAMOTIDINE 20 MG/1
20 TABLET, FILM COATED ORAL
Status: CANCELLED | OUTPATIENT
Start: 2025-07-06

## 2025-07-06 RX ORDER — SCOPOLAMINE 1 MG/3D
1 PATCH, EXTENDED RELEASE TRANSDERMAL
Status: CANCELLED | OUTPATIENT
Start: 2025-07-07 | End: 2025-07-07

## 2025-07-06 RX ORDER — CELECOXIB 200 MG/1
200 CAPSULE ORAL
Status: CANCELLED | OUTPATIENT
Start: 2025-07-07 | End: 2025-07-07

## 2025-07-06 RX ORDER — GABAPENTIN 300 MG/1
300 CAPSULE ORAL
Status: CANCELLED | OUTPATIENT
Start: 2025-07-07 | End: 2025-07-07

## 2025-07-06 RX ORDER — ACETAMINOPHEN 500 MG
1000 TABLET ORAL
Status: CANCELLED | OUTPATIENT
Start: 2025-07-07 | End: 2025-07-07

## 2025-07-09 ENCOUNTER — PATIENT MESSAGE (OUTPATIENT)
Dept: SURGERY | Facility: HOSPITAL | Age: 55
End: 2025-07-09
Payer: MEDICAID

## 2025-07-10 ENCOUNTER — HOSPITAL ENCOUNTER (OUTPATIENT)
Facility: HOSPITAL | Age: 55
Discharge: HOME | End: 2025-07-10
Attending: OBSTETRICS & GYNECOLOGY | Admitting: OBSTETRICS & GYNECOLOGY
Payer: MEDICAID

## 2025-07-10 ENCOUNTER — ANESTHESIA (OUTPATIENT)
Dept: SURGERY | Facility: HOSPITAL | Age: 55
End: 2025-07-10
Payer: MEDICAID

## 2025-07-10 DIAGNOSIS — R93.89 THICKENED ENDOMETRIUM: Primary | ICD-10-CM

## 2025-07-10 DIAGNOSIS — N95.0 POSTMENOPAUSAL BLEEDING: ICD-10-CM

## 2025-07-10 LAB
B-HCG UR QL: NEGATIVE
CTP QC/QA: YES
POCT GLUCOSE: 145 MG/DL (ref 70–110)
POCT GLUCOSE: 152 MG/DL (ref 70–110)

## 2025-07-10 PROCEDURE — 36000706: Performed by: OBSTETRICS & GYNECOLOGY

## 2025-07-10 PROCEDURE — 25000003 PHARM REV CODE 250: Performed by: SPECIALIST

## 2025-07-10 PROCEDURE — 63600175 PHARM REV CODE 636 W HCPCS: Mod: JZ,TB

## 2025-07-10 PROCEDURE — 71000033 HC RECOVERY, INTIAL HOUR: Performed by: OBSTETRICS & GYNECOLOGY

## 2025-07-10 PROCEDURE — 36000707: Performed by: OBSTETRICS & GYNECOLOGY

## 2025-07-10 PROCEDURE — 71000015 HC POSTOP RECOV 1ST HR: Performed by: OBSTETRICS & GYNECOLOGY

## 2025-07-10 PROCEDURE — 88305 TISSUE EXAM BY PATHOLOGIST: CPT | Mod: TC,91 | Performed by: OBSTETRICS & GYNECOLOGY

## 2025-07-10 PROCEDURE — 37000008 HC ANESTHESIA 1ST 15 MINUTES: Performed by: OBSTETRICS & GYNECOLOGY

## 2025-07-10 PROCEDURE — 37000009 HC ANESTHESIA EA ADD 15 MINS: Performed by: OBSTETRICS & GYNECOLOGY

## 2025-07-10 PROCEDURE — 63600175 PHARM REV CODE 636 W HCPCS: Performed by: SPECIALIST

## 2025-07-10 PROCEDURE — 27201423 OPTIME MED/SURG SUP & DEVICES STERILE SUPPLY: Performed by: OBSTETRICS & GYNECOLOGY

## 2025-07-10 PROCEDURE — 81025 URINE PREGNANCY TEST: CPT | Performed by: NURSE PRACTITIONER

## 2025-07-10 RX ORDER — MUPIROCIN 20 MG/G
OINTMENT TOPICAL
Status: DISCONTINUED | OUTPATIENT
Start: 2025-07-10 | End: 2025-07-10 | Stop reason: HOSPADM

## 2025-07-10 RX ORDER — ONDANSETRON HYDROCHLORIDE 2 MG/ML
INJECTION, SOLUTION INTRAVENOUS
Status: DISCONTINUED | OUTPATIENT
Start: 2025-07-10 | End: 2025-07-10

## 2025-07-10 RX ORDER — DEXAMETHASONE SODIUM PHOSPHATE 4 MG/ML
INJECTION, SOLUTION INTRA-ARTICULAR; INTRALESIONAL; INTRAMUSCULAR; INTRAVENOUS; SOFT TISSUE
Status: DISCONTINUED | OUTPATIENT
Start: 2025-07-10 | End: 2025-07-10

## 2025-07-10 RX ORDER — FENTANYL CITRATE 50 UG/ML
INJECTION, SOLUTION INTRAMUSCULAR; INTRAVENOUS
Status: DISCONTINUED | OUTPATIENT
Start: 2025-07-10 | End: 2025-07-10

## 2025-07-10 RX ORDER — GLUCAGON 1 MG
1 KIT INJECTION
Status: DISCONTINUED | OUTPATIENT
Start: 2025-07-10 | End: 2025-07-10 | Stop reason: HOSPADM

## 2025-07-10 RX ORDER — KETOROLAC TROMETHAMINE 30 MG/ML
INJECTION, SOLUTION INTRAMUSCULAR; INTRAVENOUS
Status: DISCONTINUED | OUTPATIENT
Start: 2025-07-10 | End: 2025-07-10

## 2025-07-10 RX ORDER — SODIUM CHLORIDE, SODIUM LACTATE, POTASSIUM CHLORIDE, CALCIUM CHLORIDE 600; 310; 30; 20 MG/100ML; MG/100ML; MG/100ML; MG/100ML
INJECTION, SOLUTION INTRAVENOUS CONTINUOUS PRN
Status: DISCONTINUED | OUTPATIENT
Start: 2025-07-10 | End: 2025-07-10

## 2025-07-10 RX ORDER — TRAMADOL HYDROCHLORIDE 50 MG/1
50 TABLET, FILM COATED ORAL
Status: COMPLETED | OUTPATIENT
Start: 2025-07-10 | End: 2025-07-10

## 2025-07-10 RX ORDER — DIPHENHYDRAMINE HYDROCHLORIDE 50 MG/ML
25 INJECTION, SOLUTION INTRAMUSCULAR; INTRAVENOUS ONCE AS NEEDED
Status: DISCONTINUED | OUTPATIENT
Start: 2025-07-10 | End: 2025-07-10 | Stop reason: HOSPADM

## 2025-07-10 RX ORDER — HYDROMORPHONE HYDROCHLORIDE 1 MG/ML
0.5 INJECTION, SOLUTION INTRAMUSCULAR; INTRAVENOUS; SUBCUTANEOUS EVERY 5 MIN PRN
Status: DISCONTINUED | OUTPATIENT
Start: 2025-07-10 | End: 2025-07-10 | Stop reason: HOSPADM

## 2025-07-10 RX ORDER — LIDOCAINE HYDROCHLORIDE 20 MG/ML
INJECTION INTRAVENOUS
Status: DISCONTINUED | OUTPATIENT
Start: 2025-07-10 | End: 2025-07-10

## 2025-07-10 RX ORDER — MIDAZOLAM HYDROCHLORIDE 2 MG/2ML
2 INJECTION, SOLUTION INTRAMUSCULAR; INTRAVENOUS ONCE
Status: DISCONTINUED | OUTPATIENT
Start: 2025-07-10 | End: 2025-07-10 | Stop reason: HOSPADM

## 2025-07-10 RX ORDER — ACETAMINOPHEN 500 MG
1000 TABLET ORAL
Status: COMPLETED | OUTPATIENT
Start: 2025-07-10 | End: 2025-07-10

## 2025-07-10 RX ORDER — MIDAZOLAM HYDROCHLORIDE 1 MG/ML
INJECTION INTRAMUSCULAR; INTRAVENOUS
Status: DISCONTINUED | OUTPATIENT
Start: 2025-07-10 | End: 2025-07-10

## 2025-07-10 RX ORDER — IPRATROPIUM BROMIDE AND ALBUTEROL SULFATE 2.5; .5 MG/3ML; MG/3ML
3 SOLUTION RESPIRATORY (INHALATION) ONCE AS NEEDED
Status: DISCONTINUED | OUTPATIENT
Start: 2025-07-10 | End: 2025-07-10 | Stop reason: HOSPADM

## 2025-07-10 RX ORDER — PROCHLORPERAZINE EDISYLATE 5 MG/ML
5 INJECTION INTRAMUSCULAR; INTRAVENOUS ONCE AS NEEDED
Status: DISCONTINUED | OUTPATIENT
Start: 2025-07-10 | End: 2025-07-10 | Stop reason: HOSPADM

## 2025-07-10 RX ORDER — SODIUM CHLORIDE 9 MG/ML
INJECTION, SOLUTION INTRAVENOUS CONTINUOUS
Status: DISCONTINUED | OUTPATIENT
Start: 2025-07-10 | End: 2025-07-10 | Stop reason: HOSPADM

## 2025-07-10 RX ORDER — IBUPROFEN 600 MG/1
600 TABLET, FILM COATED ORAL EVERY 6 HOURS
Qty: 40 TABLET | Refills: 0 | Status: SHIPPED | OUTPATIENT
Start: 2025-07-10 | End: 2025-07-20

## 2025-07-10 RX ORDER — ONDANSETRON HYDROCHLORIDE 2 MG/ML
4 INJECTION, SOLUTION INTRAVENOUS ONCE
Status: DISCONTINUED | OUTPATIENT
Start: 2025-07-10 | End: 2025-07-10 | Stop reason: HOSPADM

## 2025-07-10 RX ORDER — GABAPENTIN 300 MG/1
600 CAPSULE ORAL
Status: COMPLETED | OUTPATIENT
Start: 2025-07-10 | End: 2025-07-10

## 2025-07-10 RX ORDER — PROPOFOL 10 MG/ML
INJECTION, EMULSION INTRAVENOUS
Status: DISCONTINUED | OUTPATIENT
Start: 2025-07-10 | End: 2025-07-10

## 2025-07-10 RX ORDER — OXYCODONE AND ACETAMINOPHEN 5; 325 MG/1; MG/1
2 TABLET ORAL ONCE
Status: DISCONTINUED | OUTPATIENT
Start: 2025-07-10 | End: 2025-07-10 | Stop reason: HOSPADM

## 2025-07-10 RX ORDER — SODIUM CHLORIDE, SODIUM LACTATE, POTASSIUM CHLORIDE, CALCIUM CHLORIDE 600; 310; 30; 20 MG/100ML; MG/100ML; MG/100ML; MG/100ML
INJECTION, SOLUTION INTRAVENOUS CONTINUOUS
Status: DISCONTINUED | OUTPATIENT
Start: 2025-07-10 | End: 2025-07-10 | Stop reason: HOSPADM

## 2025-07-10 RX ORDER — HYDROMORPHONE HYDROCHLORIDE 1 MG/ML
0.2 INJECTION, SOLUTION INTRAMUSCULAR; INTRAVENOUS; SUBCUTANEOUS EVERY 5 MIN PRN
Status: DISCONTINUED | OUTPATIENT
Start: 2025-07-10 | End: 2025-07-10 | Stop reason: HOSPADM

## 2025-07-10 RX ADMIN — SODIUM CHLORIDE, POTASSIUM CHLORIDE, SODIUM LACTATE AND CALCIUM CHLORIDE: 600; 310; 30; 20 INJECTION, SOLUTION INTRAVENOUS at 11:07

## 2025-07-10 RX ADMIN — MIDAZOLAM HYDROCHLORIDE 2 MG: 1 INJECTION, SOLUTION INTRAMUSCULAR; INTRAVENOUS at 11:07

## 2025-07-10 RX ADMIN — FENTANYL CITRATE 25 MCG: 50 INJECTION INTRAMUSCULAR; INTRAVENOUS at 11:07

## 2025-07-10 RX ADMIN — KETOROLAC TROMETHAMINE 15 MG: 30 INJECTION INTRAMUSCULAR; INTRAVENOUS at 11:07

## 2025-07-10 RX ADMIN — TRAMADOL HYDROCHLORIDE 50 MG: 50 TABLET, COATED ORAL at 09:07

## 2025-07-10 RX ADMIN — LIDOCAINE HYDROCHLORIDE 80 MG: 20 INJECTION INTRAVENOUS at 11:07

## 2025-07-10 RX ADMIN — ACETAMINOPHEN 1000 MG: 500 TABLET ORAL at 09:07

## 2025-07-10 RX ADMIN — DEXAMETHASONE SODIUM PHOSPHATE 8 MG: 4 INJECTION, SOLUTION INTRA-ARTICULAR; INTRALESIONAL; INTRAMUSCULAR; INTRAVENOUS; SOFT TISSUE at 11:07

## 2025-07-10 RX ADMIN — GABAPENTIN 600 MG: 300 CAPSULE ORAL at 09:07

## 2025-07-10 RX ADMIN — SODIUM CHLORIDE, POTASSIUM CHLORIDE, SODIUM LACTATE AND CALCIUM CHLORIDE: 600; 310; 30; 20 INJECTION, SOLUTION INTRAVENOUS at 10:07

## 2025-07-10 RX ADMIN — PROPOFOL 200 MG: 10 INJECTION, EMULSION INTRAVENOUS at 11:07

## 2025-07-10 RX ADMIN — ONDANSETRON 4 MG: 2 INJECTION INTRAMUSCULAR; INTRAVENOUS at 11:07

## 2025-07-10 NOTE — INTERVAL H&P NOTE
H&P Interval Update    Patient seen and evaluated by myself and the staff attending this morning. There have been no changes since her preop visit with Dr. Rodriguez and Dr. Saldana (see below). The patient is able to express in her own words the procedure undergoing on today and wishes to proceed. No changes in PMH/ED visits/Hospital admissions since last visit (H&P noted below). NPO since 7 PM yesterday. She is here today with Bambi (Son), who can be reached at [phone]. Her preferred pharmacy is [facility] pharmacy.    To the OR for hysteroscopy, D&C for PMB, thickened endometrial stripe.      Juan F Marion D.O.  PGY-2  Obstetrics & Gynecology  [facility]

## 2025-07-10 NOTE — OP NOTE
[facility]  Gynecology  Operative Note      Date of Procedure: 7/10/2025     Procedure: Hysteroscopy, D&C     Surgeons and Role:  Shane Morales MD - Primary  Elham Rodriguez MD - Resident  Backer-Juan F Elizondo - Resident    Assisting Surgeon: None    Pre-Operative Diagnosis: PMB, thickened endometrium     Post-Operative Diagnosis: PMB, thickened endometrium     Anesthesia: General    Operative Findings (including complications, if any):   EUA: Normal external genitalia without lesion. Anteverted small uterus 6-7 cm size, mobile, firm, smooth contour, no masses. No adnexal masses or fullness. Vaginal vault without lesion, discharge, or bleeding. Small cervix visualized without lesion or erythema. Posterior cervix nearly flush with vaginal mucosa, larger anterior lip of cervix. 2-3 cm below pubic arch, 8 cm between ischial spines; long vaginal canal.  Hysteroscopy: Cervical canal normal without polyps or lesion. Atrophic, pale endometrium visualized globally, with slight hyperemia of anterior fundal endometrium. No lesion or polyps appreciated. Samples taken via Kevorkian curette of endocervix and separate endometrial curettage. Tubal ostia visualized without lesion bilaterally.                       Description of Technical Procedures:   Procedure:  The patient was taken to the operating room with IVF running. SCDs were placed on bilateral lower extremities for DVT prophylaxis.  General anesthesia was obtained without difficulty and found to be adequate.  She was placed in the dorsal lithotomy position with legs in Pablito type stirrups.  Exam under anesthesia revealed the findings as above. She was prepped and draped in the normal sterile fashion. A bivalve speculum was placed into the vagina, with excellent visualization of cervix. The anterior lip of the cervix was grasped with a single-toothed tenaculum.  The cervix was sequentially dilated to 7 mm using Hegar dialators. A 6.9 mm Myosure  hysteroscope was introduced into the cervix with hydrodistension. The above findings were then noted. Photographs were taken of the endometrial cavity.The hysteroscope was then removed. The endocervix was curetted with Kevorkian curette. The uterus was curetted in a clockwise fashion until a gritty feeling was noted in all aspects of the uterus. The endocervical curettings and endometrial curettings were sent to pathology separately. The tenaculum was removed from the cervix and good hemostasis was noted at puncture sites.     The patient tolerated the procedure well. The instrument and sponge counts were correct times two. The patient awaked from anesthesia and was taken to recovery in stable condition.    Dr. Morales was present for the entire procedure.     Significant Surgical Tasks Conducted by the Assistant(s), if Applicable: None    Estimated Blood Loss: < 5 mL     Hysteroscopic Fluid Deficit: 65 mL (normal saline)     IVF: 500 mL crystalloid           Implants: None    Specimens:   Specimen (24h ago, onward)       Start     Ordered    07/10/25 1145  Specimen to Pathology Gynecology and Obstetrics  RELEASE UPON ORDERING        References:    Click here for ordering Quick Tip   Question:  Release to patient  Answer:  Immediate    07/10/25 1145                   ID Type Source Tests Collected by Time Destination   A : endocervical curettings Tissue Endocervix SPECIMEN TO PATHOLOGY Anamaria Muller RN 7/10/2025 1141    B : endometrial curettings Tissue Endometrium SPECIMEN TO PATHOLOGY Anamaria Muller RN 7/10/2025 1145               Condition: Good    Disposition: PACU - hemodynamically stable.    Attestation: Performed under direct supervision of Dr. Morales who was present for the entirety of the procedure    Discharge Note    OUTCOME: Patient tolerated treatment/procedure well without complication and is now ready for discharge.    DISPOSITION: Home or Self Care    FINAL DIAGNOSIS:  PMB, thickened  endometrium    FOLLOWUP: 2 week telehealth post-op visit    DISCHARGE INSTRUCTIONS:    Discharge Procedure Orders   Diet Adult Regular     Other restrictions (specify):   Order Comments: Nothing per vagina, no soaking in water, bathing, or pool for 4 weeks     Notify your health care provider if you experience any of the following:   Order Comments: Vaginal bleeding soaking more than 1 pad per hour for multiple hours     Notify your health care provider if you experience any of the following:  increased confusion or weakness     Notify your health care provider if you experience any of the following:  persistent dizziness, light-headedness, or visual disturbances     Notify your health care provider if you experience any of the following:  worsening rash     Notify your health care provider if you experience any of the following:  severe persistent headache     Notify your health care provider if you experience any of the following:  difficulty breathing or increased cough     Notify your health care provider if you experience any of the following:  redness, tenderness, or signs of infection (pain, swelling, redness, odor or green/yellow discharge around incision site)     Notify your health care provider if you experience any of the following:  severe uncontrolled pain     Notify your health care provider if you experience any of the following:  persistent nausea and vomiting or diarrhea     Notify your health care provider if you experience any of the following:  temperature >100.4     No dressing needed     Activity as tolerated       Juan F Marion D.O.  PGY-2  Obstetrics & Gynecology  [facility]

## 2025-07-10 NOTE — BRIEF OP NOTE
[facility]  Brief Operative Note    Surgery Date: 7/10/2025     Surgeons and Role:  Shane Morales MD - Primary  Elham Rodriguez MD - Resident  Backer-Juan F Elizondo - Resident    Assisting Surgeon: None    Pre-op Diagnosis: PMB, thickened endometrium    Post-op Diagnosis:  PMB, thickened endometrium    Procedure: Hysteroscopy, D&C    Anesthesia: General    Operative Findings:   EUA: Normal external genitalia without lesion. Anteverted small uterus 6-7 cm size, mobile, firm, smooth contour, no masses. No adnexal masses or fullness. Vaginal vault without lesion, discharge, or bleeding. Small cervix visualized without lesion or erythema. Posterior cervix nearly flush with vaginal mucosa, larger anterior lip of cervix. 2-3 cm below pubic arch, 8 cm between ischial spines; long vaginal canal.  Hysteroscopy: Cervical canal normal without polyps or lesion. Atrophic, pale endometrium visualized globally, with slight hyperemia of anterior fundal endometrium. No lesion or polyps appreciated. Samples taken via Kevorkian curette of endocervix and separate endometrial curettage. Tubal ostia visualized without lesion bilaterally.                       Estimated Blood Loss: < 5 mL    Hysteroscopic Fluid Deficit: 65 mL (normal saline)    IVF: 500 mL crystalloid         Specimens:   Specimen (24h ago, onward)       Start     Ordered    07/10/25 1145  Specimen to Pathology  RELEASE UPON ORDERING        References:    Click here for ordering Quick Tip   Question:  Release to patient  Answer:  Immediate    07/10/25 1145                    ID Type Source Tests Collected by Time Destination   A : endocervical curettings Tissue Endocervix SPECIMEN TO PATHOLOGY Anamaria Muller RN 7/10/2025 1141    B : endometrial curettings Tissue Endometrium SPECIMEN TO PATHOLOGY Anamaria Muller RN 7/10/2025 1145            Discharge Note    OUTCOME: Patient tolerated treatment/procedure well without complication and is now ready  for discharge.    DISPOSITION: Home or Self Care    FINAL DIAGNOSIS:  PMB, thickened endometrium    FOLLOWUP: 2 week telehealth post-op visit    DISCHARGE INSTRUCTIONS:    Discharge Procedure Orders   Diet Adult Regular     Other restrictions (specify):   Order Comments: Nothing per vagina, no soaking in water, bathing, or pool for 4 weeks     Notify your health care provider if you experience any of the following:   Order Comments: Vaginal bleeding soaking more than 1 pad per hour for multiple hours     Notify your health care provider if you experience any of the following:  increased confusion or weakness     Notify your health care provider if you experience any of the following:  persistent dizziness, light-headedness, or visual disturbances     Notify your health care provider if you experience any of the following:  worsening rash     Notify your health care provider if you experience any of the following:  severe persistent headache     Notify your health care provider if you experience any of the following:  difficulty breathing or increased cough     Notify your health care provider if you experience any of the following:  redness, tenderness, or signs of infection (pain, swelling, redness, odor or green/yellow discharge around incision site)     Notify your health care provider if you experience any of the following:  severe uncontrolled pain     Notify your health care provider if you experience any of the following:  persistent nausea and vomiting or diarrhea     Notify your health care provider if you experience any of the following:  temperature >100.4     No dressing needed     Activity as tolerated       Juan F Marion D.O.  PGY-2  Obstetrics & Gynecology  [facility]

## 2025-07-10 NOTE — TRANSFER OF CARE
Anesthesia Transfer of Care Note    Patient: Richa Blum    Procedure(s) Performed: Procedure(s) (LRB):  HYSTEROSCOPY, WITH DILATION AND CURETTAGE OF UTERUS (N/A)    Patient location: PACU    Anesthesia Type: general    Transport from OR: Transported from OR on room air with adequate spontaneous ventilation    Post pain: adequate analgesia    Post assessment: no apparent anesthetic complications    Post vital signs: stable    Level of consciousness: sedated    Nausea/Vomiting: no nausea/vomiting    Complications: none    Transfer of care protocol was followed      Last vitals: Visit Vitals  /70   Pulse 71   Temp 36.4 °C (97.6 °F) (Axillary)   Resp 17   SpO2 98%   Breastfeeding No

## 2025-07-10 NOTE — DISCHARGE INSTRUCTIONS
· Keep TELEMEDICINE follow up appointment with [facility]. Resume home medications unless otherwise instructed by your doctor.    · Pelvic rest for 4 weeks (NO baths, NO soaking, tampons, douches or sex).    · Take pain medication as prescribed. If you are experiencing severe pain even with your pain medications, please call the Womens clinic at [phone] to notify your doctor.    · Take over the counter laxatives such as Miralax for constipation. Do not strain to have a bowel movement.    · Brace belly with pillow when coughing/sneezing/deep breathing.    · No driving or consuming alcohol for the next 24 hours.    · Some bleeding/spotting is to be expected for several days.    · Notify MD of bleeding more than 1 pad per hour, any moderate to severe pain unrelieved by pain medicine or for any signs of infection including fever above 100.4, yellow/green foul-smelling drainage, nausea or vomiting. Clinics number: [phone], or after business hours or emergency call [phone].    · Thanks for choosing [facility]! Have a smooth recovery!

## 2025-07-10 NOTE — ANESTHESIA PROCEDURE NOTES
Intubation    Date/Time: 7/10/2025 11:20 AM    Performed by: Keena Madrid CRNA  Authorized by: Anjelica Contreras MD    Intubation:     Induction:  Intravenous    Intubated:  Postinduction    Mask Ventilation:  Easy mask    Attempts:  1    Attempted By:  CRNA    Difficult Airway Encountered?: No      Airway Device:  Supraglottic airway/LMA    Airway Device Size:  4.0    Placement Verified By:  Capnometry    Complicating Factors:  None    Findings Post-Intubation:  Atraumatic/condition of teeth unchanged and BS equal bilateral

## 2025-07-11 VITALS
HEART RATE: 75 BPM | RESPIRATION RATE: 18 BRPM | OXYGEN SATURATION: 98 % | DIASTOLIC BLOOD PRESSURE: 93 MMHG | SYSTOLIC BLOOD PRESSURE: 140 MMHG | TEMPERATURE: 98 F

## 2025-07-11 NOTE — ANESTHESIA POSTPROCEDURE EVALUATION
Anesthesia Post Evaluation    Patient: Richa Blum    Procedure(s) Performed: Procedure(s) (LRB):  HYSTEROSCOPY, WITH DILATION AND CURETTAGE OF UTERUS (N/A)    Final Anesthesia Type: general      Patient location during evaluation: PACU  Patient participation: Yes- Able to Participate  Level of consciousness: awake and responds to stimulation  Post-procedure vital signs: reviewed and stable  Pain management: adequate  Airway patency: patent    PONV status at discharge: No PONV  Anesthetic complications: no      Cardiovascular status: blood pressure returned to baseline  Respiratory status: unassisted  Hydration status: euvolemic  Follow-up not needed.          Vitals Value Taken Time   /93 07/10/25 13:01   Temp 36.5 °C (97.7 °F) 07/10/25 12:20   Pulse 74 07/10/25 13:08   Resp 18 07/10/25 12:20   SpO2 96 % 07/10/25 13:08   Vitals shown include unfiled device data.      Event Time   Out of Recovery 12:20:00         Pain/Amanda Score: Pain Rating Prior to Med Admin: 0 (7/10/2025  9:15 AM)  Amanda Score: 10 (7/10/2025 12:26 PM)  Modified Amanda Score: 20 (7/10/2025  1:10 PM)

## 2025-07-14 LAB
ESTROGEN SERPL-MCNC: NORMAL PG/ML
INSULIN SERPL-ACNC: NORMAL U[IU]/ML
LAB AP CLINICAL INFORMATION: NORMAL
LAB AP GROSS DESCRIPTION: NORMAL
LAB AP REPORT FOOTNOTES: NORMAL

## 2025-07-17 ENCOUNTER — DOCUMENTATION ONLY (OUTPATIENT)
Dept: GYNECOLOGY | Facility: CLINIC | Age: 55
End: 2025-07-17
Payer: MEDICAID

## 2025-07-17 NOTE — PROGRESS NOTES
Surgery Date: 7/10/2025      Patient Name: Richa Blum (56 yo)      Pre-Op Diagnosis: PMB, thickened endometrial stripe      Procedure: Hysteroscopy, D&C      Path:    Endocervical Curettings: Scant fragments of endometrial epithelium admixed with fragments of benign endocervical mucosa.   Endometrial Curettings: Scant fragments of superficial endometrial tissue.      Plan: If recurrent PMB, could offer combination patch or IUD for endometrial protection. Recommend definitive management ([facility] candidate).     Juan F Marion D.O.  PGY-2  Obstetrics & Gynecology  [facility]

## 2025-07-30 ENCOUNTER — CLINICAL SUPPORT (OUTPATIENT)
Dept: GYNECOLOGY | Facility: CLINIC | Age: 55
End: 2025-07-30
Payer: MEDICAID

## 2025-07-30 DIAGNOSIS — N95.1 VASOMOTOR SYMPTOMS DUE TO MENOPAUSE: ICD-10-CM

## 2025-07-30 DIAGNOSIS — N95.0 PMB (POSTMENOPAUSAL BLEEDING): Primary | ICD-10-CM

## 2025-07-30 RX ORDER — PAROXETINE 7.5 MG/1
1 CAPSULE ORAL
COMMUNITY
Start: 2025-07-07

## 2025-07-30 RX ORDER — LIRAGLUTIDE 6 MG/ML
INJECTION SUBCUTANEOUS
COMMUNITY
Start: 2025-06-26

## 2025-07-30 RX ORDER — PROGESTERONE 200 MG/1
CAPSULE ORAL
COMMUNITY

## 2025-07-30 NOTE — PROGRESS NOTES
[facility] OB/GYN CLINIC NOTE  [facility]  [address] [place] [address]  Phone: [phone]  Fax: [phone]    [facility] Progress Note    Primary Site: [facility]  Patient location: Home  Physician location: In office      Chief Complaint: 2 weeks s/p Hysteroscopy D&C for PMB and thickened endometrium    HPI  Richa Blum joined me via our telemedicine platform.    Patient is a 56 yo female s/p hysteroscopy D&C for PMB and thickened endometrial stripe.    Since surgery she says she is doing excellent. Endorses 1 day of vaginal spotting initially, with no bleeding since. Denies any pain since procedure. Denies fever or chills. No urinary or bowel complaints. No other acute complaints at this time.    She was previously on HRT with estrogen transdermal patch and 100 mcg prometrium daily. PMB occurred in context of forgetting to take prometrium.  She has not been taking HRT since surgery and endorses return of her hot flushes.    Intra-Op Findings:  EUA: Normal external genitalia without lesion. Anteverted small uterus 6-7 cm size, mobile, firm, smooth contour, no masses. No adnexal masses or fullness. Vaginal vault without lesion, discharge, or bleeding. Small cervix visualized without lesion or erythema. Posterior cervix nearly flush with vaginal mucosa, larger anterior lip of cervix. 2-3 cm below pubic arch, 8 cm between ischial spines; long vaginal canal.  Hysteroscopy: Cervical canal normal without polyps or lesion. Atrophic, pale endometrium visualized globally, with slight hyperemia of anterior fundal endometrium. No lesion or polyps appreciated. Samples taken via Kevorkian curette of endocervix and separate endometrial curettage. Tubal ostia visualized without lesion bilaterally.     Pathology:  Endocervical Curettings: Scant fragments of endometrial epithelium admixed with fragments of benign endocervical mucosa.   Endometrial Curettings: Scant fragments of superficial endometrial tissue.     I  have reviewed family history, social history, medications and allergies as documented in the patient's electronic medical record.      Reports, labs, outside records, and images have been reviewed with pertinent interpretations below. See telemedicine notes records for intervening communications.        Assessment/Plan  Problem List Items Addressed This Visit          Renal/    Vasomotor symptoms due to menopause    Relevant Medications    estradiol-norethindrone (COMBIPATCH) 0.05-0.14 mg/24 hr     Other Visit Diagnoses         PMB (postmenopausal bleeding)    -  Primary        PMB:  S/p HSC D&C for PMB and thickened endometrium  Benign pathology of endocervix and endometrium; intra-op findings c/w postmenopausal atrophic endometrium, no lesions.  Doing well post-operatively  No further restrictions post-operatively at this time.  Vasomotor symptoms:  Significant relief previously with HRT, transdermal estrogen and prometrium PO  Patient occasionally non-adherent to PO prometrium and subsequently with PMB  We discussed importance of endometrial protection with progestin, and suggested switching to combined HRT patch with estrogen/progestin; patient amenable  Rx sent for combipatch   Initially low dose 0.05/0.14 mg estradiol-norethindrone; may increase dose as needed if no relief after 1 month    -- RTC for annual well woman in 1 year.    See correspondence above for plan.   Patient's needs assessed and health education provided. Patient understands risks, benefits, and alternatives of treatment prescribed above. Patient verbalizes understanding and agrees to follow plan.    Patient's identity was confirmed and confidentiality/privacy confirmed prior to visit. I certify that this visit was done via secure two-way transmission with informed consent of the patient and/or guardian.  Each patient to whom I provide medical services by telemedicine is:  (1) informed of the relationship between the physician and patient  and the respective role of any other health care provider with respect to management of the patient; and (2) notified that they may decline to receive medical services by telemedicine and may withdraw from such care at any time. Patient verbally consented to receive this service via voice-only telephone call.    Juan F Marion D.O.  PGY-2  Obstetrics & Gynecology  [facility]

## 2025-07-31 ENCOUNTER — TELEPHONE (OUTPATIENT)
Dept: GYNECOLOGY | Facility: CLINIC | Age: 55
End: 2025-07-31
Payer: MEDICAID

## 2025-07-31 NOTE — TELEPHONE ENCOUNTER
----- Message from Juan F Marion sent at 7/30/2025  4:08 PM CDT -----  Good afternoon,    Could we please schedule MsTennille Kulwant for annual well woman with NP in 1 year.    Thank you,    Juan F Marion D.O.  PGY-2  Obstetrics & Gynecology  [facility]

## 2025-08-01 ENCOUNTER — TELEPHONE (OUTPATIENT)
Dept: GYNECOLOGY | Facility: CLINIC | Age: 55
End: 2025-08-01
Payer: MEDICAID

## 2025-08-01 NOTE — TELEPHONE ENCOUNTER
----- Message from Juan F Marion sent at 7/30/2025  4:08 PM CDT -----  Good afternoon,    Could we please schedule MsTennille Kulwant for annual well woman with NP in 1 year.    Thank you,    Juan F Marion D.O.  PGY-2  Obstetrics & Gynecology  [facility]

## 2025-08-04 PROBLEM — E11.9 TYPE 2 DIABETES MELLITUS WITHOUT COMPLICATION, WITHOUT LONG-TERM CURRENT USE OF INSULIN: Status: ACTIVE | Noted: 2024-02-12

## 2025-08-04 PROBLEM — E78.00 HYPERCHOLESTEREMIA: Status: ACTIVE | Noted: 2025-08-04

## 2025-08-04 PROBLEM — F41.1 GENERALIZED ANXIETY DISORDER: Status: ACTIVE | Noted: 2025-08-04

## 2025-08-04 PROBLEM — F32.9 MAJOR DEPRESSIVE EPISODE: Status: ACTIVE | Noted: 2025-08-04

## 2025-08-04 PROBLEM — E03.9 ACQUIRED HYPOTHYROIDISM: Status: ACTIVE | Noted: 2024-02-12

## 2025-08-04 PROBLEM — I10 ESSENTIAL HYPERTENSION: Status: ACTIVE | Noted: 2025-08-04

## 2025-08-04 PROBLEM — F90.0 ATTENTION DEFICIT HYPERACTIVITY DISORDER, PREDOMINANTLY INATTENTIVE TYPE: Status: ACTIVE | Noted: 2025-08-04

## 2025-08-06 ENCOUNTER — TELEPHONE (OUTPATIENT)
Dept: GYNECOLOGY | Facility: CLINIC | Age: 55
End: 2025-08-06
Payer: MEDICAID

## 2025-08-06 DIAGNOSIS — R35.0 URINARY FREQUENCY: Primary | ICD-10-CM

## 2025-08-07 NOTE — TELEPHONE ENCOUNTER
Patient called and identit confirmed.  Patient recently underwent, 2 weeks post-op, from Hysteroscopy D&C.  Now called clinic stating she has been experiencing increased urinary frequency and more urge incontinence than previous; patient with urge incontinence and using Myrbetriq. Denies dysuria, hematuria, incomplete void, fever, chills.    Stated it may be transient inflammation, and would otherwise not expect any urinary symptoms from hysteroscopy. However, cannot rule out UTI. Placed order for UA and instructed patient to draw. Will call if concerning results warranting tx.    Juan F Marion D.O.  PGY-2  Obstetrics & Gynecology  [facility]'

## 2025-08-11 PROBLEM — E11.9 TYPE 2 DIABETES MELLITUS WITHOUT COMPLICATION, WITHOUT LONG-TERM CURRENT USE OF INSULIN: Status: RESOLVED | Noted: 2024-02-12 | Resolved: 2025-08-11

## (undated) DEVICE — PACK SURG LITHOTOMY 3 SIRUS

## (undated) DEVICE — PAD CURAD NONADH 3X4IN

## (undated) DEVICE — GOWN POLY REINF X-LONG 2XL

## (undated) DEVICE — JELLY SURGILUBE LUBE PKT 3GM

## (undated) DEVICE — GLOVE SENSICARE PI GRN 7

## (undated) DEVICE — GLOVE SENSICARE NEOPRENE 6

## (undated) DEVICE — KIT SURGICAL TURNOVER

## (undated) DEVICE — GLOVE SIGNATURE MICRO LTX 6

## (undated) DEVICE — PACK FLUENT DISPOSABLE

## (undated) DEVICE — TRAY SKIN SCRUB WET PREMIUM

## (undated) DEVICE — GOWN POLY REINF BRTH SLV XL

## (undated) DEVICE — DRAPE UNDERBUTTOCKS PCH STRL

## (undated) DEVICE — SPONGE LAP 18X18 PREWASHED

## (undated) DEVICE — PAD PREP CUFFED NS 24X48IN

## (undated) DEVICE — COVER CAMERA 21X16X19IN

## (undated) DEVICE — SPONGE GAUZE 16PLY 4X4

## (undated) DEVICE — GLOVE SIGNATURE MICRO LTX 6.5

## (undated) DEVICE — MARKER WRITESITE SKIN CHLRAPRP

## (undated) DEVICE — GLOVE SENSICARE PI GRN 6.5

## (undated) DEVICE — SOL NACL IRR 3000ML

## (undated) DEVICE — SOLIDIFIER BOTTLE 1500CC

## (undated) DEVICE — GLOVE SENSICARE NEOPRENE 7

## (undated) DEVICE — SEAL LENS SCOPE MYOSURE

## (undated) DEVICE — COVER HANDLE LIGHT RIGID

## (undated) DEVICE — TOWEL OR DISP STRL BLUE 4/PK